# Patient Record
Sex: MALE | Race: BLACK OR AFRICAN AMERICAN | Employment: FULL TIME | URBAN - METROPOLITAN AREA
[De-identification: names, ages, dates, MRNs, and addresses within clinical notes are randomized per-mention and may not be internally consistent; named-entity substitution may affect disease eponyms.]

---

## 2017-02-09 ENCOUNTER — OFFICE VISIT (OUTPATIENT)
Dept: UROLOGY | Age: 67
End: 2017-02-09

## 2017-02-09 VITALS
DIASTOLIC BLOOD PRESSURE: 80 MMHG | HEIGHT: 70 IN | SYSTOLIC BLOOD PRESSURE: 120 MMHG | WEIGHT: 239 LBS | TEMPERATURE: 97.7 F | BODY MASS INDEX: 34.22 KG/M2 | OXYGEN SATURATION: 97 % | HEART RATE: 88 BPM

## 2017-02-09 DIAGNOSIS — N45.1 EPIDIDYMITIS, LEFT: ICD-10-CM

## 2017-02-09 DIAGNOSIS — N40.0 BENIGN PROSTATIC HYPERPLASIA, PRESENCE OF LOWER URINARY TRACT SYMPTOMS UNSPECIFIED, UNSPECIFIED MORPHOLOGY: ICD-10-CM

## 2017-02-09 DIAGNOSIS — N41.1 CHRONIC PROSTATITIS: ICD-10-CM

## 2017-02-09 DIAGNOSIS — N50.819 TESTICLE PAIN: Primary | ICD-10-CM

## 2017-02-09 LAB
BILIRUB UR QL STRIP: NEGATIVE
GLUCOSE UR-MCNC: NEGATIVE MG/DL
KETONES P FAST UR STRIP-MCNC: NEGATIVE MG/DL
PH UR STRIP: 5.5 [PH] (ref 4.6–8)
PROT UR QL STRIP: NEGATIVE MG/DL
SP GR UR STRIP: 1.03 (ref 1–1.03)
UA UROBILINOGEN AMB POC: NORMAL (ref 0.2–1)
URINALYSIS CLARITY POC: CLEAR
URINALYSIS COLOR POC: YELLOW
URINE BLOOD POC: NEGATIVE
URINE LEUKOCYTES POC: NEGATIVE
URINE NITRITES POC: NEGATIVE

## 2017-02-09 RX ORDER — GLUCOSAM/CHONDRO/HERB 149/HYAL 750-100 MG
TABLET ORAL
COMMUNITY

## 2017-02-09 RX ORDER — SULFAMETHOXAZOLE AND TRIMETHOPRIM 800; 160 MG/1; MG/1
1 TABLET ORAL 2 TIMES DAILY
COMMUNITY
End: 2020-03-23 | Stop reason: SDUPTHER

## 2017-02-09 NOTE — MR AVS SNAPSHOT
Visit Information Date & Time Provider Department Dept. Phone Encounter #  
 2/9/2017  3:15 PM Anton Tenorio, [address]  [facility] [id number] Your Appointments 3/23/2017  2:45 PM  
Office Visit with Anton Tenorio MD  
[facility]) Appt Note: PSA  
 [address] [address]  
[phone] [address] 600 [facility] [address] Upcoming Health Maintenance Date Due Hepatitis C Screening 1950 DTaP/Tdap/Td series (1 - Tdap) 3/15/1973 FOBT Q 1 YEAR AGE 50-75 3/15/2002 ZOSTER VACCINE AGE 60> 3/15/2012 INFLUENZA AGE 9 TO ADULT 8/1/2016 Allergies as of 2/9/2017  Review Complete On: 2/9/2017 By: Anton Tenorio MD  
  
 Severity Noted Reaction Type Reactions Pcn [Penicillins]   Side Effect Diarrhea Affects blood pressure Current Immunizations  Never Reviewed No immunizations on file. Not reviewed this visit You Were Diagnosed With   
  
 Codes Comments Testicle pain    -  Primary ICD-10-CM: N50.819 ICD-9-CM: 691. 9 Vitals BP Pulse Temp Height(growth percentile) Weight(growth percentile) SpO2  
 120/80 (BP 1 Location: Left arm, BP Patient Position: Sitting) 88 97.7 °F (36.5 °C) (Oral) 5' 10\" (1.778 m) 239 lb (108.4 kg) 97% BMI Smoking Status 34.29 kg/m2 Never Smoker Vitals History BMI and BSA Data Body Mass Index Body Surface Area  
 34.29 kg/m 2 2.31 m 2 Preferred Pharmacy Pharmacy Name Phone [id number] [address] [place] [address] Your Updated Medication List  
  
   
This list is accurate as of: 2/9/17  3:59 PM.  Always use your most recent med list.  
  
  
  
  
 cyclobenzaprine 5 mg tablet Commonly known as:  FLEXERIL Take 2 Tabs by mouth three (3) times daily (with meals). Indications: MUSCLE SPASM eye lubricant combination no.1 2-0.9-1.8 % Drop Apply  to eye.  
  
 losartan-hydroCHLOROthiazide 50-12.5 mg per tablet Commonly known as:  HYZAAR Omega-3-DHA-EPA-Fish Oil 1,000 mg (120 mg-180 mg) Cap Take  by mouth. ONE-A-DAY MEN'S PO Take  by mouth. trimethoprim-sulfamethoxazole 160-800 mg per tablet Commonly known as:  BACTRIM DS, SEPTRA DS Take 1 Tab by mouth two (2) times a day. We Performed the Following AMB POC URINALYSIS DIP STICK AUTO W/O MICRO [34846 CPT(R)] Patient Instructions Testicular Pain: Care Instructions Your Care Instructions Pain in the testicles can be caused by many things. These include an injury to your testicles, an infection, and testicular torsion. Injuries and genital problems most often happen during sports or recreational activities, at work, or in a fall. Pain caused by an injury usually goes away quickly. There is usually no long-term harm to your testicles. Infections that may cause pain include: · An infection of the testicles. This is called orchitis. · An abscess in the scrotum or testicles. · Some sexually transmitted infections (STIs). · A swelling of the tube attached to a testicle. This swelling is called epididymitis. It can cause pain and is sometimes caused by an infection. Testicular torsion happens when a testicle twists on the spermatic cord. This cuts off the blood supply to the testicle. This is a serious condition that requires surgery. Follow-up care is a key part of your treatment and safety. Be sure to make and go to all appointments, and call your doctor if you are having problems. It's also a good idea to know your test results and keep a list of the medicines you take. How can you care for yourself at home? · Rest and protect your testicles and groin. Stop, change, or take a break from any activity that may be causing your pain or soreness. · Put ice or a cold pack on the area for 10 to 20 minutes at a time. Put a thin cloth between the ice and your skin. · Wear briefs, not boxers. Briefs help support the injured area. You can use a jock strap if it helps relieve your pain. · If your doctor prescribed antibiotics, take them as directed. Do not stop taking them just because you feel better. You need to take the full course of antibiotics. · Ask your doctor if you can take an over-the-counter pain medicine, such as acetaminophen (Tylenol), ibuprofen (Advil, Motrin), or naproxen (Aleve). Be safe with medicines. Read and follow all instructions on the label. · If the doctor gave you a prescription medicine for pain, take it as prescribed. When should you call for help? Call your doctor now or seek immediate medical care if: 
· You have severe or increasing pain. · You notice a change in how your testicles look or are positioned in your scrotum. · You notice new or worse swelling in your scrotum. · You have symptoms of a urinary problem, such as a urinary tract infection. These may include: 
¨ Pain or burning when you urinate. ¨ A frequent need to urinate without being able to pass much urine. ¨ Pain in the flank, which is just below the rib cage and above the waist on either side of the back. ¨ Blood in your urine. ¨ A fever. Watch closely for changes in your health, and be sure to contact your doctor if: 
· You do not get better as expected. Where can you learn more? Go to http://isak-elliot.info/. Enter F038 in the search box to learn more about \"Testicular Pain: Care Instructions. \" Current as of: August 12, 2016 Content Version: 11.1 © 3617-2238 [employer]Field [employer]. Care instructions adapted under license by [employer] (which disclaims liability or warranty for this information).  If you have questions about a medical condition or this instruction, always ask your healthcare professional. [place] [address] any warranty or liability for your use of this information. Introducing [facility] SERVICES! Tasha Verde introduces Planview patient portal. Now you can access parts of your medical record, email your doctor's office, and request medication refills online. 1. In your internet browser, go to [email or web address] 2. Click on the First Time User? Click Here link in the Sign In box. You will see the New Member Sign Up page. 3. Enter your Planview Access Code exactly as it appears below. You will not need to use this code after youve completed the sign-up process. If you do not sign up before the expiration date, you must request a new code. · Planview Access Code: [email or web address] Expires: 5/10/2017  3:07 PM 
 
4. Enter the last four digits of your Social Security Number (xxxx) and Date of Birth (mm/dd/yyyy) as indicated and click Submit. You will be taken to the next sign-up page. 5. Create a Planview ID. This will be your Planview login ID and cannot be changed, so think of one that is secure and easy to remember. 6. Create a Planview password. You can change your password at any time. 7. Enter your Password Reset Question and Answer. This can be used at a later time if you forget your password. 8. Enter your e-mail address. You will receive e-mail notification when new information is available in [address] 9. Click Sign Up. You can now view and download portions of your medical record. 10. Click the Download Summary menu link to download a portable copy of your medical information. If you have questions, please visit the Frequently Asked Questions section of the Planview website. Remember, Planview is NOT to be used for urgent needs. For medical emergencies, dial 911. Now available from your iPhone and Android! Please provide this summary of care documentation to your next provider. Your primary care clinician is listed as Za Ayon. If you have any questions after today's visit, please call [phone].

## 2017-02-09 NOTE — PROGRESS NOTES
Mr. Ilana Bassett has a reminder for a \"due or due soon\" health maintenance. I have asked that he contact his primary care provider for follow-up on this health maintenance.

## 2017-02-09 NOTE — PATIENT INSTRUCTIONS
Testicular Pain: Care Instructions  Your Care Instructions    Pain in the testicles can be caused by many things. These include an injury to your testicles, an infection, and testicular torsion. Injuries and genital problems most often happen during sports or recreational activities, at work, or in a fall. Pain caused by an injury usually goes away quickly. There is usually no long-term harm to your testicles. Infections that may cause pain include:  · An infection of the testicles. This is called orchitis. · An abscess in the scrotum or testicles. · Some sexually transmitted infections (STIs). · A swelling of the tube attached to a testicle. This swelling is called epididymitis. It can cause pain and is sometimes caused by an infection. Testicular torsion happens when a testicle twists on the spermatic cord. This cuts off the blood supply to the testicle. This is a serious condition that requires surgery. Follow-up care is a key part of your treatment and safety. Be sure to make and go to all appointments, and call your doctor if you are having problems. It's also a good idea to know your test results and keep a list of the medicines you take. How can you care for yourself at home? · Rest and protect your testicles and groin. Stop, change, or take a break from any activity that may be causing your pain or soreness. · Put ice or a cold pack on the area for 10 to 20 minutes at a time. Put a thin cloth between the ice and your skin. · Wear briefs, not boxers. Briefs help support the injured area. You can use a jock strap if it helps relieve your pain. · If your doctor prescribed antibiotics, take them as directed. Do not stop taking them just because you feel better. You need to take the full course of antibiotics. · Ask your doctor if you can take an over-the-counter pain medicine, such as acetaminophen (Tylenol), ibuprofen (Advil, Motrin), or naproxen (Aleve). Be safe with medicines.  Read and follow all instructions on the label. · If the doctor gave you a prescription medicine for pain, take it as prescribed. When should you call for help? Call your doctor now or seek immediate medical care if:  · You have severe or increasing pain. · You notice a change in how your testicles look or are positioned in your scrotum. · You notice new or worse swelling in your scrotum. · You have symptoms of a urinary problem, such as a urinary tract infection. These may include:  ¨ Pain or burning when you urinate. ¨ A frequent need to urinate without being able to pass much urine. ¨ Pain in the flank, which is just below the rib cage and above the waist on either side of the back. ¨ Blood in your urine. ¨ A fever. Watch closely for changes in your health, and be sure to contact your doctor if:  · You do not get better as expected. Where can you learn more? Go to http://isak-elliot.info/. Enter R639 in the search box to learn more about \"Testicular Pain: Care Instructions. \"  Current as of: August 12, 2016  Content Version: 11.1  © 0431-2500 [employer]. Care instructions adapted under license by [employer] (which disclaims liability or warranty for this information). If you have questions about a medical condition or this instruction, always ask your healthcare professional. [address] any warranty or liability for your use of this information.

## 2017-02-10 LAB — PSA SERPL-MCNC: 1.46 NG/ML

## 2017-02-10 NOTE — PROGRESS NOTES
PSA 1.4 on 10 February 2017      PSA 0.9 in 2011  PSA 2.4 in September 2014  PSA 1.25 on 24 February 2016        Impression stable PSA elevation      Rosa Jhaveri MD

## 2017-02-10 NOTE — PROGRESS NOTES
Nette Lopez 61 y.o. male     Mr. Lopez seen today for evaluation of irritative voiding symptoms associated with dysuria and left testalgia treated with [facility] primary care clinic several weeks ago initiated with left flank pain-is asymptomatic at this time voiding with a forceful stream good control nocturia once or twice per night-currently on Flomax 0.4 mg daily for relief of mild obstructive voiding and nocturia twice per night-also has history of kidney stones with KUB x-ray negative for signs of kidney stones in 2014    History of left epididymitis in 2015      Urine culture on 18 September 2014 100,000 colonies Escherichia coli sens to Septra  Urine culture March 2016 mixed colonies     PSA 0.9 in 2011  PSA 2.4 in September 2014  PSA 1.25 on 24 February 2016     Review of Systems:    CNS: negative  Respiratory: negative  Cardiovascular:hypertension  Intestinal:negative  Urinary: irritative voiding symptoms secondary to BPH/ left scrotal pain secondary to suspected chronic epididymitis secondary to prostatitis  Skeletal: osteoarthritis  Endocrine:negative  Other:    Allergies: Allergies   Allergen Reactions    Pcn [Penicillins] Diarrhea     Affects blood pressure        Medications:    Current Outpatient Prescriptions   Medication Sig Dispense Refill    Omega-3-DHA-EPA-Fish Oil 1,000 mg (120 mg-180 mg) cap Take  by mouth.  trimethoprim-sulfamethoxazole (BACTRIM DS, SEPTRA DS) 160-800 mg per tablet Take 1 Tab by mouth two (2) times a day.  losartan-hydrochlorothiazide (HYZAAR) 50-12.5 mg per tablet       MULTIVITS-MINERALS/FA/LYCOPENE (ONE-A-DAY MEN'S PO) Take  by mouth.  eye lubricant combination no.1 2-0.9-1.8 % drop Apply  to eye.  cyclobenzaprine (FLEXERIL) 5 mg tablet Take 2 Tabs by mouth three (3) times daily (with meals). Indications:  MUSCLE SPASM 15 Tab 0       Past Medical History   Diagnosis Date    Acquired cyst of kidney     Arthritis     Essential hypertension     Hypertension     Orchitis and epididymitis, unspecified       Past Surgical History   Procedure Laterality Date    Hx appendectomy      Hx refractive surgery       Family History   Problem Relation Age of Onset    Diabetes Other     Heart Disease Other     Kidney Disease Other         Physical Examination: Well-nourished mature male in no apparent distress    Scrotum-no spermatic cord induration or tenderness on either side-left epididymis is normal in consistency-no induration no tenderness testes are normal in size shape and consistency bilaterally-  Prostate by AUREA is rounded, smooth, benign consistency and nontender-no induration no nodularity  No rectal masses induration or tenderness    Urinalysis: Negative dipstick/nitrite negative    Ultrasound imaging of the kidneys today shows no signs of obstruction on either side    Impression: Left epididymitis responding favorably to treatment with Septra                         Chronic prostatitis                        Symptomatic BPH responding favorably to alpha-blocker treatment    Plan: Septra DS twice daily ×3 weeks as needed symptoms flareup            PSA today            Flomax 0.4 mg daily #90 refill ×3    rtc 1 yr PSA AUREA PVR    More than 1/2 of this 25 minute visit was spent in counselling and coordination of care, as described above. Gamal Patiño MD  -electronically signed-    PLEASE NOTE:  This document has been produced using voice recognition software. Unrecognized errors in transcription may be present.

## 2017-09-13 ENCOUNTER — OFFICE VISIT (OUTPATIENT)
Dept: UROLOGY | Age: 67
End: 2017-09-13

## 2017-09-13 VITALS — BODY MASS INDEX: 33.64 KG/M2 | HEIGHT: 70 IN | WEIGHT: 235 LBS

## 2017-09-13 DIAGNOSIS — R35.0 FREQUENT URINATION: ICD-10-CM

## 2017-09-13 DIAGNOSIS — N39.0 URINARY TRACT INFECTION WITHOUT HEMATURIA, SITE UNSPECIFIED: ICD-10-CM

## 2017-09-13 DIAGNOSIS — N41.1 CHRONIC PROSTATITIS: ICD-10-CM

## 2017-09-13 DIAGNOSIS — R30.0 DYSURIA: Primary | ICD-10-CM

## 2017-09-13 DIAGNOSIS — N40.1 BPH LOC W URIN OBS/LUTS: ICD-10-CM

## 2017-09-13 LAB
BILIRUB UR QL STRIP: NEGATIVE
GLUCOSE UR-MCNC: NEGATIVE MG/DL
KETONES P FAST UR STRIP-MCNC: NEGATIVE MG/DL
PH UR STRIP: 7 [PH] (ref 4.6–8)
PROT UR QL STRIP: NORMAL MG/DL
SP GR UR STRIP: 1.02 (ref 1–1.03)
UA UROBILINOGEN AMB POC: NORMAL (ref 0.2–1)
URINALYSIS CLARITY POC: CLEAR
URINALYSIS COLOR POC: YELLOW
URINE BLOOD POC: NEGATIVE
URINE LEUKOCYTES POC: NEGATIVE
URINE NITRITES POC: NEGATIVE

## 2017-09-13 RX ORDER — TAMSULOSIN HYDROCHLORIDE 0.4 MG/1
0.4 CAPSULE ORAL DAILY
Qty: 90 CAP | Refills: 3 | Status: SHIPPED | OUTPATIENT
Start: 2017-09-13 | End: 2020-03-18

## 2017-09-13 RX ORDER — LOSARTAN POTASSIUM 25 MG/1
25 TABLET ORAL
COMMUNITY

## 2017-09-13 NOTE — PATIENT INSTRUCTIONS

## 2017-09-13 NOTE — MR AVS SNAPSHOT
Visit Information Date & Time Provider Department Dept. Phone Encounter #  
 9/13/2017  9:15 AM Kisha Mcpherson, [address] [place] [facility] [phone] [id number] Your Appointments 2/8/2018  3:15 PM  
Office Visit with Kisha Mcpherson MD  
[facility] [address]) Appt Note: check up [address] [place] [address] [address]  
[phone] [address] [facility] [address] Upcoming Health Maintenance Date Due Hepatitis C Screening 1950 DTaP/Tdap/Td series (1 - Tdap) 3/15/1973 FOBT Q 1 YEAR AGE 50-75 3/15/2002 ZOSTER VACCINE AGE 60> 1/15/2012 GLAUCOMA SCREENING Q2Y 3/15/2017 Pneumococcal 65+ Low/Medium Risk (1 of 2 - PCV13) 3/15/2017 MEDICARE YEARLY EXAM 3/15/2017 INFLUENZA AGE 9 TO ADULT 8/1/2017 Allergies as of 9/13/2017  Review Complete On: 9/13/2017 By: Kisha Mcpherson MD  
  
 Severity Noted Reaction Type Reactions Ciprofloxacin  09/13/2017   Systemic Other (comments) Pcn [Penicillins]  07/03/2017   Side Effect Diarrhea Other reaction(s): other/intolerance Diarrhea and vomiting Affects blood pressure Septra [Sulfamethoxazole-trimethoprim]  09/13/2017    Hives Current Immunizations  Never Reviewed No immunizations on file. Not reviewed this visit You Were Diagnosed With   
  
 Codes Comments Dysuria    -  Primary ICD-10-CM: R30.0 ICD-9-CM: 788.1 Frequent urination     ICD-10-CM: R35.0 ICD-9-CM: 788.41 Vitals Height(growth percentile) Weight(growth percentile) BMI Smoking Status 5' 10\" (1.778 m) 235 lb (106.6 kg) 33.72 kg/m2 Never Smoker BMI and BSA Data Body Mass Index Body Surface Area 33.72 kg/m 2 2.29 m 2 Preferred Pharmacy Pharmacy Name Phone [id number] [address] [place] [address] Your Updated Medication List  
  
   
 This list is accurate as of: 9/13/17 10:53 AM.  Always use your most recent med list.  
  
  
  
  
 cyclobenzaprine 5 mg tablet Commonly known as:  FLEXERIL Take 2 Tabs by mouth three (3) times daily (with meals). Indications: MUSCLE SPASM  
  
 eye lubricant combination no.1 2-0.9-1.8 % Drop Apply  to eye.  
  
 losartan 25 mg tablet Commonly known as:  COZAAR  
25 mg.  
  
 losartan-hydroCHLOROthiazide 50-12.5 mg per tablet Commonly known as:  HYZAAR Omega-3-DHA-EPA-Fish Oil 1,000 mg (120 mg-180 mg) Cap Take  by mouth. ONE-A-DAY MEN'S PO Take  by mouth. trimethoprim-sulfamethoxazole 160-800 mg per tablet Commonly known as:  BACTRIM DS, SEPTRA DS Take 1 Tab by mouth two (2) times a day. We Performed the Following AMB POC URINALYSIS DIP STICK AUTO W/O MICRO [89021 CPT(R)] Patient Instructions Urinary Tract Infections in Men: Care Instructions Your Care Instructions A urinary tract infection, or UTI, is a general term for an infection anywhere between the kidneys and the tip of the penis. UTIs can also be a result of a prostate problem. Most cause pain or burning when you urinate. Most UTIs are caused by bacteria and can be cured with antibiotics. It is important to complete your treatment so that the infection does not get worse. Follow-up care is a key part of your treatment and safety. Be sure to make and go to all appointments, and call your doctor if you are having problems. It's also a good idea to know your test results and keep a list of the medicines you take. How can you care for yourself at home? · Take your antibiotics as prescribed. Do not stop taking them just because you feel better. You need to take the full course of antibiotics. · Take your medicines exactly as prescribed.  Your doctor may have prescribed a medicine, such as phenazopyridine (Pyridium), to help relieve pain when you urinate. This turns your urine orange. You may stop taking it when your symptoms get better. But be sure to take all of your antibiotics, which treat the infection. · Drink extra water for the next day or two. This will help make the urine less concentrated and help wash out the bacteria causing the infection. (If you have kidney, heart, or liver disease and have to limit your fluids, talk with your doctor before you increase your fluid intake.) · Avoid drinks that are carbonated or have caffeine. They can irritate the bladder. · Urinate often. Try to empty your bladder each time. · To relieve pain, take a hot bath or lay a heating pad (set on low) over your lower belly or genital area. Never go to sleep with a heating pad in place. To help prevent UTIs · Drink plenty of fluids, enough so that your urine is light yellow or clear like water. If you have kidney, heart, or liver disease and have to limit fluids, talk with your doctor before you increase the amount of fluids you drink. · Urinate when you have the urge. Do not hold your urine for a long time. Urinate before you go to sleep. · Keep your penis clean. Catheter care If you have a drainage tube (catheter) in place, the following steps will help you care for it. · Always wash your hands before and after touching your catheter. · Check the area around the urethra for inflammation or signs of infection. Signs of infection include irritated, swollen, red, or tender skin, or pus around the catheter. · Clean the area around the catheter with soap and water two times a day. Dry with a clean towel afterward. · Do not apply powder or lotion to the skin around the catheter. To empty the urine collection bag · Wash your hands with soap and water. · Without touching the drain spout, remove the spout from its sleeve at the bottom of the collection bag. Open the valve on the spout. · Let the urine flow out of the bag and into the toilet or a container. Do not let the tubing or drain spout touch anything. · After you empty the bag, clean the end of the drain spout with tissue and water. Close the valve and put the drain spout back into its sleeve at the bottom of the collection bag. · Wash your hands with soap and water. When should you call for help? Call your doctor now or seek immediate medical care if: · Symptoms such as a fever, chills, nausea, or vomiting get worse or happen for the first time. · You have new pain in your back just below your rib cage. This is called flank pain. · There is new blood or pus in your urine. · You are not able to take or keep down your antibiotics. Watch closely for changes in your health, and be sure to contact your doctor if: 
· You are not getting better after taking an antibiotic for 2 days. · Your symptoms go away but then come back. Where can you learn more? Go to http://isak-elliot.info/. Enter H285 in the search box to learn more about \"Urinary Tract Infections in Men: Care Instructions. \" Current as of: November 28, 2016 Content Version: 11.3 © 7515-1652 [employer]. Care instructions adapted under license by [employer] (which disclaims liability or warranty for this information). If you have questions about a medical condition or this instruction, always ask your healthcare professional. [place] [address] any warranty or liability for your use of this information. Introducing [facility] HE[facility] SERVICES! Sintia Judge introduces [employer] patient portal. Now you can access parts of your medical record, email your doctor's office, and request medication refills online. 1. In your internet browser, go to [email or web address] 2. Click on the First Time User? Click Here link in the Sign In box. You will see the New Member Sign Up page. 3. Enter your [employer] Access Code exactly as it appears below. You will not need to use this code after youve completed the sign-up process. If you do not sign up before the expiration date, you must request a new code. · [employer] Access Code: [email or web address] Expires: 12/12/2017  8:59 AM 
 
4. Enter the last four digits of your Social Security Number (xxxx) and Date of Birth (mm/dd/yyyy) as indicated and click Submit. You will be taken to the next sign-up page. 5. Create a [employer] ID. This will be your [employer] login ID and cannot be changed, so think of one that is secure and easy to remember. 6. Create a [employer] password. You can change your password at any time. 7. Enter your Password Reset Question and Answer. This can be used at a later time if you forget your password. 8. Enter your e-mail address. You will receive e-mail notification when new information is available in [address] 9. Click Sign Up. You can now view and download portions of your medical record. 10. Click the Download Summary menu link to download a portable copy of your medical information. If you have questions, please visit the Frequently Asked Questions section of the [employer] website. Remember, [employer] is NOT to be used for urgent needs. For medical emergencies, dial 911. Now available from your iPhone and Android! Please provide this summary of care documentation to your next provider. Your primary care clinician is listed as Coni Salmon. If you have any questions after today's visit, please call [phone].

## 2017-09-13 NOTE — PROGRESS NOTES
Mr. Estrada Butler has a reminder for a \"due or due soon\" health maintenance. I have asked that he contact his primary care provider for follow-up on this health maintenance.

## 2017-09-14 NOTE — PROGRESS NOTES
Nasra Haydee Lopez 74 y.o. male     Mr. Lopez seen today for follow-up evaluation of symptomatic BPH with irritative voiding symptoms responding favorably to alpha-blocker therapy-Flomax 0.4 mg daily  Also has history of kidney stone disease with negative KUB x-ray in 2014  Patient is now voiding with a solid stream good control nocturia once or twice per night  irritative voiding symptoms associated with dysuria and left testalgia treated with [facility] primary care clinic several weeks ago initiated with left flank pain-is asymptomatic at this time voiding with a forceful stream good control nocturia once or twice per night-currently on Flomax 0.4 mg daily for relief of mild obstructive voiding and nocturia twice per night-also has history of kidney stones with KUB x-ray negative for signs of kidney stones in 2014     History of left epididymitis in 2015        Urine culture on 18 September 2014 100,000 colonies Escherichia coli sens to Septra  Urine culture March 2016 mixed colonies     PSA 0.9 in 2011  PSA 2.4 in September 2014  PSA 1.25 on 24 February 2016  PSA 1.4 in February 2017      Review of Systems:    CNS: negative  Respiratory: negative  Cardiovascular:hypertension  Intestinal:negative  Urinary: irritative voiding symptoms secondary to BPH/ left scrotal pain secondary to suspected chronic epididymitis secondary to prostatitis  Skeletal: osteoarthritis  Endocrine:negative  Other:  Allergies: Allergies   Allergen Reactions    Ciprofloxacin Other (comments)    Pcn [Penicillins] Diarrhea     Other reaction(s): other/intolerance  Diarrhea and vomiting  Affects blood pressure      Septra [Sulfamethoxazole-Trimethoprim] Hives      Medications:    Current Outpatient Prescriptions   Medication Sig Dispense Refill    losartan (COZAAR) 25 mg tablet 25 mg.      MULTIVITS-MINERALS/FA/LYCOPENE (ONE-A-DAY MEN'S PO) Take  by mouth.  Omega-3-DHA-EPA-Fish Oil 1,000 mg (120 mg-180 mg) cap Take  by mouth.       trimethoprim-sulfamethoxazole (BACTRIM DS, SEPTRA DS) 160-800 mg per tablet Take 1 Tab by mouth two (2) times a day.  eye lubricant combination no.1 2-0.9-1.8 % drop Apply  to eye.  cyclobenzaprine (FLEXERIL) 5 mg tablet Take 2 Tabs by mouth three (3) times daily (with meals). Indications: MUSCLE SPASM 15 Tab 0    losartan-hydrochlorothiazide (HYZAAR) 50-12.5 mg per tablet          Past Medical History:   Diagnosis Date    Acquired cyst of kidney     Arthritis     Essential hypertension     Hypertension     Orchitis and epididymitis, unspecified       Past Surgical History:   Procedure Laterality Date    HX APPENDECTOMY      HX REFRACTIVE SURGERY       Family History   Problem Relation Age of Onset    Diabetes Other     Heart Disease Other     Kidney Disease Other         Physical Examination: Well-nourished mature male in no apparent distress    Normal prostate by [facility] in February 2017    Urinalysis: Negative dipstick/nitrite negative    IN today 22 cc    Impression: Symptomatic BPH responding favorably to alpha-blocker therapy                                         History of urinary tract infection                        -Chronic prostatitis responding favorably to antibiotic therapy        Plan: Flomax 0.4 mg daily #90 refill ×3    rtc 6 mo      More than 1/2 of this 15 minute visit was spent in counselling and coordination of care, as described above. Kisha Mcpherson MD  -electronically signed-    PLEASE NOTE:  This document has been produced using voice recognition software. Unrecognized errors in transcription may be present.

## 2018-08-23 ENCOUNTER — OFFICE VISIT (OUTPATIENT)
Dept: UROLOGY | Age: 68
End: 2018-08-23

## 2018-08-23 VITALS
OXYGEN SATURATION: 97 % | SYSTOLIC BLOOD PRESSURE: 138 MMHG | HEIGHT: 70 IN | WEIGHT: 227 LBS | DIASTOLIC BLOOD PRESSURE: 85 MMHG | HEART RATE: 95 BPM | BODY MASS INDEX: 32.5 KG/M2

## 2018-08-23 DIAGNOSIS — N40.1 BPH ASSOCIATED WITH NOCTURIA: ICD-10-CM

## 2018-08-23 DIAGNOSIS — M54.5 LOW BACK PAIN, UNSPECIFIED BACK PAIN LATERALITY, UNSPECIFIED CHRONICITY, WITH SCIATICA PRESENCE UNSPECIFIED: ICD-10-CM

## 2018-08-23 DIAGNOSIS — N50.819 TESTICULAR PAIN: Primary | ICD-10-CM

## 2018-08-23 DIAGNOSIS — R35.1 BPH ASSOCIATED WITH NOCTURIA: ICD-10-CM

## 2018-08-23 LAB
BILIRUB UR QL STRIP: NEGATIVE
GLUCOSE UR-MCNC: NEGATIVE MG/DL
KETONES P FAST UR STRIP-MCNC: NEGATIVE MG/DL
PH UR STRIP: 6 [PH] (ref 4.6–8)
PROT UR QL STRIP: NEGATIVE
SP GR UR STRIP: 1.02 (ref 1–1.03)
UA UROBILINOGEN AMB POC: NORMAL (ref 0.2–1)
URINALYSIS CLARITY POC: CLEAR
URINALYSIS COLOR POC: YELLOW
URINE BLOOD POC: NEGATIVE
URINE LEUKOCYTES POC: NEGATIVE
URINE NITRITES POC: NEGATIVE

## 2018-08-23 NOTE — PROGRESS NOTES
Mr. Laila Cavazos has a reminder for a \"due or due soon\" health maintenance. I have asked that he contact his primary care provider for follow-up on this health maintenance. RBV Per Dr. William Woodruff draw lab today for PSA for BPH with Nocturia.

## 2018-08-23 NOTE — MR AVS SNAPSHOT
[facility] [address] [address] 
[phone] Patient: Cindy Stevens MRN: [id number] XUP:1/88/6611 Visit Information Date & Time Provider Department Dept. Phone Encounter #  
 8/23/2018  2:00 PM Rosa Jhaveri, Melody Crisfield Tabatha [facility] [id number] Upcoming Health Maintenance Date Due Hepatitis C Screening 1950 DTaP/Tdap/Td series (1 - Tdap) 3/15/1973 FOBT Q 1 YEAR AGE 50-75 3/15/2002 ZOSTER VACCINE AGE 60> 1/15/2012 GLAUCOMA SCREENING Q2Y 3/15/2017 Pneumococcal 65+ Low/Medium Risk (1 of 2 - PCV13) 3/15/2017 Influenza Age 5 to Adult 8/1/2018 Allergies as of 8/23/2018  Review Complete On: 8/23/2018 By: Yady Paredes LPN Severity Noted Reaction Type Reactions Ciprofloxacin  09/13/2017   Systemic Other (comments) Pcn [Penicillins]  07/03/2017   Side Effect Diarrhea Other reaction(s): other/intolerance Diarrhea and vomiting Affects blood pressure Septra [Sulfamethoxazole-trimethoprim]  09/13/2017    Hives Current Immunizations  Never Reviewed No immunizations on file. Not reviewed this visit You Were Diagnosed With   
  
 Codes Comments Testicular pain    -  Primary ICD-10-CM: E63.475 ICD-9-CM: 608.9 Low back pain, unspecified back pain laterality, unspecified chronicity, with sciatica presence unspecified     ICD-10-CM: M54.5 ICD-9-CM: 724.2 BPH associated with nocturia     ICD-10-CM: N40.1, R35.1 ICD-9-CM: 600.01, 788.43 Vitals BP Pulse Height(growth percentile) Weight(growth percentile) SpO2 BMI  
 138/85 (BP 1 Location: Left arm, BP Patient Position: Sitting) 95 5' 10\" (1.778 m) 227 lb (103 kg) 97% 32.57 kg/m2 Smoking Status Never Smoker Vitals History BMI and BSA Data Body Mass Index Body Surface Area 32.57 kg/m 2 2.26 m 2 Preferred Pharmacy Pharmacy Name Phone [id number] [address] [place] [address] Your Updated Medication List  
  
   
This list is accurate as of 8/23/18  3:10 PM.  Always use your most recent med list.  
  
  
  
  
 cyclobenzaprine 5 mg tablet Commonly known as:  FLEXERIL Take 2 Tabs by mouth three (3) times daily (with meals). Indications: MUSCLE SPASM  
  
 eye lubricant combination no.1 2-0.9-1.8 % Drop Apply  to eye.  
  
 losartan 25 mg tablet Commonly known as:  COZAAR  
25 mg.  
  
 losartan-hydroCHLOROthiazide 50-12.5 mg per tablet Commonly known as:  HYZAAR  
  
 omega 3-DHA-EPA-fish oil 1,000 mg (120 mg-180 mg) capsule Take  by mouth. ONE-A-DAY MEN'S PO Take  by mouth. tamsulosin 0.4 mg capsule Commonly known as:  FLOMAX Take 1 Cap by mouth daily. Indications: SYMPTOMATIC BENIGN PROSTATIC HYPERPLASIA  
  
 trimethoprim-sulfamethoxazole 160-800 mg per tablet Commonly known as:  BACTRIM DS, SEPTRA DS Take 1 Tab by mouth two (2) times a day. We Performed the Following AMB POC URINALYSIS DIP STICK AUTO W/O MICRO [47414 CPT(R)] COLLECTION VENOUS BLOOD,VENIPUNCTURE [id number] CPT(R)] PSA, DIAGNOSTIC (PROSTATE SPECIFIC AG) [id number] CPT(R)] Patient Instructions Testicular Pain: Care Instructions Your Care Instructions Pain in the testicles can be caused by many things. These include an injury to your testicles, an infection, and testicular torsion. Injuries and genital problems most often happen during sports or recreational activities, at work, or in a fall. Pain caused by an injury usually goes away quickly. There is usually no long-term harm to your testicles. Infections that may cause pain include: · An infection of the testicles. This is called orchitis. · An abscess in the scrotum or testicles. · Some sexually transmitted infections (STIs). · A swelling of the tube attached to a testicle. This swelling is called epididymitis. It can cause pain and is sometimes caused by an infection. Testicular torsion happens when a testicle twists on the spermatic cord. This cuts off the blood supply to the testicle. This is a serious condition that requires surgery. Follow-up care is a key part of your treatment and safety. Be sure to make and go to all appointments, and call your doctor if you are having problems. It's also a good idea to know your test results and keep a list of the medicines you take. How can you care for yourself at home? · Rest and protect your testicles and groin. Stop, change, or take a break from any activity that may be causing your pain or soreness. · Put ice or a cold pack on the area for 10 to 20 minutes at a time. Put a thin cloth between the ice and your skin. · Wear briefs, not boxers. Briefs help support the injured area. You can use a jock strap if it helps relieve your pain. · If your doctor prescribed antibiotics, take them as directed. Do not stop taking them just because you feel better. You need to take the full course of antibiotics. · Ask your doctor if you can take an over-the-counter pain medicine, such as acetaminophen (Tylenol), ibuprofen (Advil, Motrin), or naproxen (Aleve). Be safe with medicines. Read and follow all instructions on the label. · If the doctor gave you a prescription medicine for pain, take it as prescribed. When should you call for help? Call your doctor now or seek immediate medical care if: 
  · You have severe or increasing pain.  
  · You notice a change in how your testicles look or are positioned in your scrotum.  
  · You notice new or worse swelling in your scrotum.  
  · You have symptoms of a urinary problem, such as a urinary tract infection. These may include: 
¨ Pain or burning when you urinate. ¨ A frequent need to urinate without being able to pass much urine. ¨ Pain in the flank, which is just below the rib cage and above the waist on either side of the back. ¨ Blood in your urine. ¨ A fever.  
 Watch closely for changes in your health, and be sure to contact your doctor if: 
  · You do not get better as expected. Where can you learn more? Go to http://isak-elliot.info/. Enter F972 in the search box to learn more about \"Testicular Pain: Care Instructions. \" Current as of: May 12, 2017 Content Version: 11.7 © 4018-5127 [employer]. Care instructions adapted under license by [employer] (which disclaims liability or warranty for this information). If you have questions about a medical condition or this instruction, always ask your healthcare professional. [address] any warranty or liability for your use of this information. Introducing [facility]! Ana Maria Whitley introduces SeatMe patient portal. Now you can access parts of your medical record, email your doctor's office, and request medication refills online. 1. In your internet browser, go to [email or web address]. [email or web address] 2. Click on the First Time User? Click Here link in the Sign In box. You will see the New Member Sign Up page. 3. Enter your SeatMe Access Code exactly as it appears below. You will not need to use this code after youve completed the sign-up process. If you do not sign up before the expiration date, you must request a new code. · SeatMe Access Code: [email or web address] Expires: 11/21/2018  1:50 PM 
 
4. Enter the last four digits of your Social Security Number (xxxx) and Date of Birth (mm/dd/yyyy) as indicated and click Submit. You will be taken to the next sign-up page. 5. Create a Slicethepiet ID. This will be your SeatMe login ID and cannot be changed, so think of one that is secure and easy to remember. 6. Create a Slicethepiet password. You can change your password at any time. 7. Enter your Password Reset Question and Answer. This can be used at a later time if you forget your password. 8. Enter your e-mail address. You will receive e-mail notification when new information is available in [address] 9. Click Sign Up. You can now view and download portions of your medical record. 10. Click the Download Summary menu link to download a portable copy of your medical information. If you have questions, please visit the Frequently Asked Questions section of the [employer] website. Remember, [employer] is NOT to be used for urgent needs. For medical emergencies, dial 911. Now available from your iPhone and Android! Please provide this summary of care documentation to your next provider. Your primary care clinician is listed as Tom Ballard. If you have any questions after today's visit, please call [phone].

## 2018-08-23 NOTE — PATIENT INSTRUCTIONS
Testicular Pain: Care Instructions  Your Care Instructions    Pain in the testicles can be caused by many things. These include an injury to your testicles, an infection, and testicular torsion. Injuries and genital problems most often happen during sports or recreational activities, at work, or in a fall. Pain caused by an injury usually goes away quickly. There is usually no long-term harm to your testicles. Infections that may cause pain include:  · An infection of the testicles. This is called orchitis. · An abscess in the scrotum or testicles. · Some sexually transmitted infections (STIs). · A swelling of the tube attached to a testicle. This swelling is called epididymitis. It can cause pain and is sometimes caused by an infection. Testicular torsion happens when a testicle twists on the spermatic cord. This cuts off the blood supply to the testicle. This is a serious condition that requires surgery. Follow-up care is a key part of your treatment and safety. Be sure to make and go to all appointments, and call your doctor if you are having problems. It's also a good idea to know your test results and keep a list of the medicines you take. How can you care for yourself at home? · Rest and protect your testicles and groin. Stop, change, or take a break from any activity that may be causing your pain or soreness. · Put ice or a cold pack on the area for 10 to 20 minutes at a time. Put a thin cloth between the ice and your skin. · Wear briefs, not boxers. Briefs help support the injured area. You can use a jock strap if it helps relieve your pain. · If your doctor prescribed antibiotics, take them as directed. Do not stop taking them just because you feel better. You need to take the full course of antibiotics. · Ask your doctor if you can take an over-the-counter pain medicine, such as acetaminophen (Tylenol), ibuprofen (Advil, Motrin), or naproxen (Aleve). Be safe with medicines.  Read and follow all instructions on the label. · If the doctor gave you a prescription medicine for pain, take it as prescribed. When should you call for help? Call your doctor now or seek immediate medical care if:    · You have severe or increasing pain.     · You notice a change in how your testicles look or are positioned in your scrotum.     · You notice new or worse swelling in your scrotum.     · You have symptoms of a urinary problem, such as a urinary tract infection. These may include:  ¨ Pain or burning when you urinate. ¨ A frequent need to urinate without being able to pass much urine. ¨ Pain in the flank, which is just below the rib cage and above the waist on either side of the back. ¨ Blood in your urine. ¨ A fever.    Watch closely for changes in your health, and be sure to contact your doctor if:    · You do not get better as expected. Where can you learn more? Go to http://isak-elliot.info/. Enter D258 in the search box to learn more about \"Testicular Pain: Care Instructions. \"  Current as of: May 12, 2017  Content Version: 11.7  © 3331-1913 Healthwise, Incorporated. Care instructions adapted under license by [employer] (which disclaims liability or warranty for this information). If you have questions about a medical condition or this instruction, always ask your healthcare professional. [address] any warranty or liability for your use of this information.

## 2018-08-24 LAB — PSA SERPL-MCNC: 1.3 NG/ML

## 2018-08-24 NOTE — PROGRESS NOTES
Keenanne Heimlich Bowser 79 y.o. male     Mr. Lopez seen today for annual prostate evaluation  Patient has history of chronic prostatitis with symptomatic flareups most recently treated with Cipro by [facility] care clinic and July 2018  Now voiding with a solid stream good control nocturia once per night-no scrotal pain    Patient has history of symptomatic BPH with irritative voiding symptoms responding favorably to alpha-blocker therapy-Flomax 0.4 mg daily  Also has history of kidney stone disease with negative KUB x-ray in 2014   irritative voiding symptoms associated with dysuria and left testalgia treated with [facility] primary care clinic several weeks ago initiated with left flank pain-      KUB x-ray negative for signs of kidney stones in 2014      History of left epididymitis in 2015          Urine culture on 18 September 2014 100,000 colonies Escherichia coli sens to Septra  Urine culture March 2016 mixed colonies      PSA 0.9 in 2011  PSA 2.4 in September 2014  PSA 1.25 on 24 February 2016  PSA 1.4 in February 2017      Review of Systems:    CNS: negative  Respiratory: negative  Cardiovascular:hypertension  Intestinal:negative  Urinary: irritative voiding symptoms secondary to BPH/ left scrotal pain secondary to suspected chronic epididymitis secondary to prostatitis  Skeletal: osteoarthritis  Endocrine:negative  Other:    Allergies: Allergies   Allergen Reactions    Ciprofloxacin Other (comments)    Pcn [Penicillins] Diarrhea     Other reaction(s): other/intolerance  Diarrhea and vomiting  Affects blood pressure      Septra [Sulfamethoxazole-Trimethoprim] Hives      Medications:    Current Outpatient Prescriptions   Medication Sig Dispense Refill    losartan (COZAAR) 25 mg tablet 25 mg.      Omega-3-DHA-EPA-Fish Oil 1,000 mg (120 mg-180 mg) cap Take  by mouth.  MULTIVITS-MINERALS/FA/LYCOPENE (ONE-A-DAY MEN'S PO) Take  by mouth.  tamsulosin (FLOMAX) 0.4 mg capsule Take 1 Cap by mouth daily.  Indications: SYMPTOMATIC BENIGN PROSTATIC HYPERPLASIA 90 Cap 3    trimethoprim-sulfamethoxazole (BACTRIM DS, SEPTRA DS) 160-800 mg per tablet Take 1 Tab by mouth two (2) times a day.  eye lubricant combination no.1 2-0.9-1.8 % drop Apply  to eye.  cyclobenzaprine (FLEXERIL) 5 mg tablet Take 2 Tabs by mouth three (3) times daily (with meals). Indications: MUSCLE SPASM 15 Tab 0    losartan-hydrochlorothiazide (HYZAAR) 50-12.5 mg per tablet          Past Medical History:   Diagnosis Date    Acquired cyst of kidney     Arthritis     Essential hypertension     Hypertension     Orchitis and epididymitis, unspecified       Past Surgical History:   Procedure Laterality Date    HX APPENDECTOMY      HX REFRACTIVE SURGERY       Social History     Social History    Marital status:      Spouse name: N/A    Number of children: N/A    Years of education: N/A     Occupational History    Not on file.      Social History Main Topics    Smoking status: Never Smoker    Smokeless tobacco: Never Used    Alcohol use No    Drug use: Not on file    Sexual activity: Not on file     Other Topics Concern    Not on file     Social History Narrative      Family History   Problem Relation Age of Onset    Diabetes Other     Heart Disease Other     Kidney Disease Other         Physical Examination: Well-nourished mature male in no apparent distress    Scrotum-testes and spermatic cords are palpably normal-nontender-no induration on either side    prostate by [facility] is smooth rounded benign consistency and nontender no induration no nodularity   No rectal masses induration or tenderness     urinalysis: Negative dipstick/nitrite negative    PVR today 5 cc    Ultrasound imaging of the kidneys today shows no signs of obstruction on either side-/incidental finding of a simple left renal cyst 3.7 x 5.6 cm    Impression: Symptomatic BPH responding favorably to alpha-blocker Rx                        Prostatitis with symptomatic flareup responding favorably to Cipro Rx                        Kidney stone history                               Plan: Flomax 0.4 mg daily #90 refill ×3            Cipro 500 mg twice daily ×10-14 days as needed prostatitis flareup    PSA today    RTC 1 year      More than 1/2 of this 25 minute visit was spent in counselling and coordination of care, as described above. Sherron Malone MD  -electronically signed-    PLEASE NOTE:  This document has been produced using voice recognition software. Unrecognized errors in transcription may be present.

## 2019-08-22 ENCOUNTER — HOSPITAL ENCOUNTER (OUTPATIENT)
Dept: LAB | Age: 69
Discharge: HOME | End: 2019-08-22

## 2019-08-22 ENCOUNTER — OFFICE VISIT (OUTPATIENT)
Dept: UROLOGY | Age: 69
End: 2019-08-22

## 2019-08-22 VITALS
DIASTOLIC BLOOD PRESSURE: 75 MMHG | HEART RATE: 91 BPM | WEIGHT: 231 LBS | BODY MASS INDEX: 33.07 KG/M2 | SYSTOLIC BLOOD PRESSURE: 121 MMHG | OXYGEN SATURATION: 97 % | HEIGHT: 70 IN

## 2019-08-22 DIAGNOSIS — Z87.442 HISTORY OF KIDNEY STONES: ICD-10-CM

## 2019-08-22 DIAGNOSIS — N40.1 BENIGN PROSTATIC HYPERPLASIA WITH LOWER URINARY TRACT SYMPTOMS, SYMPTOM DETAILS UNSPECIFIED: Primary | ICD-10-CM

## 2019-08-22 DIAGNOSIS — N41.9 PROSTATITIS, UNSPECIFIED PROSTATITIS TYPE: ICD-10-CM

## 2019-08-22 DIAGNOSIS — N40.1 BENIGN PROSTATIC HYPERPLASIA WITH LOWER URINARY TRACT SYMPTOMS, SYMPTOM DETAILS UNSPECIFIED: ICD-10-CM

## 2019-08-22 LAB
BILIRUB UR QL STRIP: NEGATIVE
GLUCOSE UR-MCNC: NEGATIVE MG/DL
KETONES P FAST UR STRIP-MCNC: NEGATIVE MG/DL
PH UR STRIP: 5.5 [PH] (ref 4.6–8)
PROT UR QL STRIP: NEGATIVE
SP GR UR STRIP: 1.03 (ref 1–1.03)
UA UROBILINOGEN AMB POC: NORMAL (ref 0.2–1)
URINALYSIS CLARITY POC: CLEAR
URINALYSIS COLOR POC: YELLOW
URINE BLOOD POC: NEGATIVE
URINE LEUKOCYTES POC: NEGATIVE
URINE NITRITES POC: NEGATIVE
XX-LABCORP SPECIMEN COL,LCBCF: NORMAL

## 2019-08-22 PROCEDURE — 99001 SPECIMEN HANDLING PT-LAB: CPT

## 2019-08-22 RX ORDER — TAMSULOSIN HYDROCHLORIDE 0.4 MG/1
0.4 CAPSULE ORAL DAILY
Qty: 90 CAP | Refills: 3 | Status: SHIPPED | OUTPATIENT
Start: 2019-08-22 | End: 2020-03-18

## 2019-08-22 NOTE — PATIENT INSTRUCTIONS
Prostate Cancer Screening: Care Instructions  Your Care Instructions    The prostate gland is an organ found just below a man's bladder. It is the size and shape of a walnut. It surrounds the tube that carries urine from the bladder out of the body through the penis. This tube is called the urethra. Prostate cancer is the abnormal growth of cells in the prostate. It is the second most common type of cancer in men. (Skin cancer is the most common.)  Most cases of prostate cancer occur in men older than 72. The disease runs in families. And it's more common in -American men. When it's found and treated early, prostate cancer may be cured. But it is not always treated. This is because prostate cancer may not shorten your life, especially if you are older and the cancer is growing slowly. Follow-up care is a key part of your treatment and safety. Be sure to make and go to all appointments, and call your doctor if you are having problems. It's also a good idea to know your test results and keep a list of the medicines you take. What are the screening tests for prostate cancer? The main screening test for prostate cancer is the prostate-specific antigen (PSA) test. This is a blood test that measures how much PSA is in your blood. A high level may mean that you have an enlargement, an infection, or cancer. Along with the PSA test, you may have a digital rectal exam. The digital (finger) rectal exam checks for anything abnormal in your prostate. To do the exam, the doctor puts a lubricated, gloved finger into your rectum. If these tests suggest cancer, you may need a prostate biopsy. How is prostate cancer diagnosed? In a biopsy, the doctor takes small tissue samples from your prostate gland. Another doctor then looks at the tissue under a microscope to see if there are cancer cells, signs of infection, or other problems. The results help diagnose prostate cancer.   What are the pros and cons of screening? Neither a PSA test nor a digital rectal exam can tell you for sure that you do or do not have cancer. But they can help you decide if you need more tests, such as a prostate biopsy. Screening tests may be useful because most men with prostate cancer don't have symptoms. It can be hard to know if you have cancer until it is more advanced. And then it's harder to treat. But having a PSA test can also cause harm. The test may show high levels of PSA that aren't caused by cancer. So you could have a prostate biopsy you didn't need. Or the PSA test might be normal when there is cancer, so a cancer might not be found early. The test can also find cancers that would never have caused a problem during your lifetime. So you might have treatment that was not needed. Prostate cancer usually develops late in life and grows slowly. For many men, it does not shorten their lives. Some experts advise screening only for men who are at high risk. Talk with your doctor to see if screening is right for you. Where can you learn more? Go to http://isak-elliot.info/. Enter R550 in the search box to learn more about \"Prostate Cancer Screening: Care Instructions. \"  Current as of: December 19, 2018  Content Version: 12.1  © 0211-3311 Healthwise, Incorporated. Care instructions adapted under license by [employer] (which disclaims liability or warranty for this information). If you have questions about a medical condition or this instruction, always ask your healthcare professional. [place] [address] any warranty or liability for your use of this information.

## 2019-08-22 NOTE — PROGRESS NOTES
MrTimothy De Los Santosmarshal Jamarjaycob has a reminder for a \"due or due soon\" health maintenance. I have asked that he contact his primary care provider for follow-up on this health maintenance.

## 2019-08-23 LAB — PSA SERPL-MCNC: 1.79 NG/ML

## 2019-08-23 NOTE — PROGRESS NOTES
Alejandra Andrea Lopez 81 y.o. male     Mr. Lopez seen today for yearly prostate evaluation  Patient is voiding with a solid stream good control nocturia once per night on alpha-blocker therapy Flomax 0.4 mg daily      Patient has history of chronic prostatitis with symptomatic flareups most recently treated with Cipro by [facility] care clinic and July 2018  Now voiding with a solid stream good control nocturia once per night-no scrotal pain     Patient has history of symptomatic BPH with irritative voiding symptoms responding favorably to alpha-blocker therapy-Flomax 0.4 mg daily  Also has history of kidney stone disease with negative KUB x-ray in 2014   irritative voiding symptoms associated with dysuria and left testalgia treated with [facility] primary care clinic several weeks ago initiated with left flank pain-       KUB x-ray negative for signs of kidney stones in 2014      History of left epididymitis in 2015          Urine culture on 18 September 2014 100,000 colonies Escherichia coli sens to Septra  Urine culture March 2016 mixed colonies      PSA 0.9 in 2011  PSA 2.4 in September 2014  PSA 1.25 on 24 February 2016  PSA 1.4 in February 2017  PSA 1.3 in August 2018    PSA PVR 5 cc in 2018  PVR 25 cc in August 2019      Review of Systems:    CNS: negative  Respiratory: negative  Cardiovascular:hypertension  Intestinal:negative  Urinary: irritative voiding symptoms secondary to BPH/ left scrotal pain secondary to suspected chronic epididymitis secondary to prostatitis  Skeletal: osteoarthritis  Endocrine:negative  Other:      Allergies: Allergies   Allergen Reactions    Ciprofloxacin Other (comments)    Pcn [Penicillins] Diarrhea     Other reaction(s): other/intolerance  Diarrhea and vomiting  Affects blood pressure      Septra [Sulfamethoxazole-Trimethoprim] Hives      Medications:    Current Outpatient Medications   Medication Sig Dispense Refill    tamsulosin (FLOMAX) 0.4 mg capsule Take 1 Cap by mouth daily.  [address] Cap [address]    losartan (COZAAR) 25 mg tablet 25 mg.  tamsulosin (FLOMAX) 0.4 mg capsule Take 1 Cap by mouth daily. Indications: SYMPTOMATIC BENIGN PROSTATIC HYPERPLASIA 90 Cap 3    Omega-3-DHA-EPA-Fish Oil 1,000 mg (120 mg-180 mg) cap Take  by mouth.  trimethoprim-sulfamethoxazole (BACTRIM DS, SEPTRA DS) 160-800 mg per tablet Take 1 Tab by mouth two (2) times a day.  eye lubricant combination no.1 2-0.9-1.8 % drop Apply  to eye.  cyclobenzaprine (FLEXERIL) 5 mg tablet Take 2 Tabs by mouth three (3) times daily (with meals). Indications: MUSCLE SPASM 15 Tab 0    losartan-hydrochlorothiazide (HYZAAR) 50-12.5 mg per tablet       MULTIVITS-MINERALS/FA/LYCOPENE (ONE-A-DAY MEN'S PO) Take  by mouth.          Past Medical History:   Diagnosis Date    Acquired cyst of kidney     Arthritis     Essential hypertension     Hypertension     Orchitis and epididymitis, unspecified       Past Surgical History:   Procedure Laterality Date    HX APPENDECTOMY      HX REFRACTIVE SURGERY       Social History     Socioeconomic History    Marital status:      Spouse name: Not on file    Number of children: Not on file    Years of education: Not on file    Highest education level: Not on file   Occupational History    Not on file   Social Needs    Financial resource strain: Not on file    Food insecurity:     Worry: Not on file     Inability: Not on file    Transportation needs:     Medical: Not on file     Non-medical: Not on file   Tobacco Use    Smoking status: Never Smoker    Smokeless tobacco: Never Used   Substance and Sexual Activity    Alcohol use: No    Drug use: Not on file    Sexual activity: Not on file   Lifestyle    Physical activity:     Days per week: Not on file     Minutes per session: Not on file    Stress: Not on file   Relationships    Social connections:     Talks on phone: Not on file     Gets together: Not on file     Attends Tenri[employer] service: Not on file     Active member of club or organization: Not on file     Attends meetings of clubs or organizations: Not on file     Relationship status: Not on file    Intimate partner violence:     Fear of current or ex partner: Not on file     Emotionally abused: Not on file     Physically abused: Not on file     Forced sexual activity: Not on file   Other Topics Concern    Not on file   Social History Narrative    Not on file      Family History   Problem Relation Age of Onset    Diabetes Other     Heart Disease Other     Kidney Disease Other         Physical Examination: Well-nourished mature male in no apparent distress    Prostate by [facility] is rounded, smooth, benign consistency nontender-no induration no nodularity  No rectal masses induration or tenderness      Urinalysis: Negative dipstick/nitrite negative/heme-negative      PVR today 25 cc      Impression: Manic BPH responding favorably to alpha-blocker Rx        Plan: Flomax 0.4 mg daily 90 refill x3    PSA today      RTC 1 year [facility] PSA PVR      More than 1/2 of this 15 minute visit was spent in counselling and coordination of care, as described above. Lexus Camilo MD  -electronically signed-    PLEASE NOTE:  This document has been produced using voice recognition software. Unrecognized errors in transcription may be present.